# Patient Record
Sex: FEMALE | Race: OTHER | HISPANIC OR LATINO | URBAN - METROPOLITAN AREA
[De-identification: names, ages, dates, MRNs, and addresses within clinical notes are randomized per-mention and may not be internally consistent; named-entity substitution may affect disease eponyms.]

---

## 2017-01-04 ENCOUNTER — EMERGENCY (EMERGENCY)
Facility: HOSPITAL | Age: 22
LOS: 1 days | Discharge: HOME | End: 2017-01-04
Attending: EMERGENCY MEDICINE
Payer: MEDICAID

## 2017-01-04 VITALS
HEIGHT: 60 IN | HEART RATE: 108 BPM | WEIGHT: 110.01 LBS | OXYGEN SATURATION: 99 % | TEMPERATURE: 98 F | SYSTOLIC BLOOD PRESSURE: 117 MMHG | RESPIRATION RATE: 20 BRPM | DIASTOLIC BLOOD PRESSURE: 61 MMHG

## 2017-01-04 DIAGNOSIS — Z3A.10 10 WEEKS GESTATION OF PREGNANCY: ICD-10-CM

## 2017-01-04 DIAGNOSIS — O21.0 MILD HYPEREMESIS GRAVIDARUM: ICD-10-CM

## 2017-01-04 PROCEDURE — 99284 EMERGENCY DEPT VISIT MOD MDM: CPT | Mod: 25

## 2017-01-05 LAB
APPEARANCE UR: CLEAR — SIGNIFICANT CHANGE UP
BILIRUB UR-MCNC: NEGATIVE — SIGNIFICANT CHANGE UP
COLOR SPEC: YELLOW — SIGNIFICANT CHANGE UP
DIFF PNL FLD: ABNORMAL
GLUCOSE UR QL: NEGATIVE — SIGNIFICANT CHANGE UP
KETONES UR-MCNC: ABNORMAL
LEUKOCYTE ESTERASE UR-ACNC: ABNORMAL
NITRITE UR-MCNC: NEGATIVE — SIGNIFICANT CHANGE UP
PH UR: 6 — SIGNIFICANT CHANGE UP (ref 4.8–8)
PROT UR-MCNC: 30 MG/DL
SP GR SPEC: 1.02 — SIGNIFICANT CHANGE UP (ref 1.01–1.02)
UROBILINOGEN FLD QL: NEGATIVE — SIGNIFICANT CHANGE UP

## 2017-01-05 PROCEDURE — 81001 URINALYSIS AUTO W/SCOPE: CPT

## 2017-01-05 PROCEDURE — 96374 THER/PROPH/DIAG INJ IV PUSH: CPT

## 2017-01-05 PROCEDURE — 99284 EMERGENCY DEPT VISIT MOD MDM: CPT | Mod: 25

## 2017-01-05 RX ORDER — ONDANSETRON 8 MG/1
1 TABLET, FILM COATED ORAL
Qty: 28 | Refills: 0 | OUTPATIENT
Start: 2017-01-05 | End: 2017-01-12

## 2017-01-05 RX ORDER — SODIUM CHLORIDE 9 MG/ML
1000 INJECTION INTRAMUSCULAR; INTRAVENOUS; SUBCUTANEOUS ONCE
Qty: 0 | Refills: 0 | Status: COMPLETED | OUTPATIENT
Start: 2017-01-05 | End: 2017-01-05

## 2017-01-05 RX ORDER — NITROFURANTOIN MACROCRYSTAL 50 MG
100 CAPSULE ORAL
Qty: 0 | Refills: 0 | Status: DISCONTINUED | OUTPATIENT
Start: 2017-01-05 | End: 2017-01-08

## 2017-01-05 RX ORDER — ONDANSETRON 8 MG/1
4 TABLET, FILM COATED ORAL ONCE
Qty: 0 | Refills: 0 | Status: COMPLETED | OUTPATIENT
Start: 2017-01-05 | End: 2017-01-05

## 2017-01-05 RX ORDER — SODIUM CHLORIDE 9 MG/ML
1000 INJECTION, SOLUTION INTRAVENOUS
Qty: 0 | Refills: 0 | Status: DISCONTINUED | OUTPATIENT
Start: 2017-01-05 | End: 2017-01-08

## 2017-01-05 RX ORDER — NITROFURANTOIN MACROCRYSTAL 50 MG
1 CAPSULE ORAL
Qty: 20 | Refills: 0 | OUTPATIENT
Start: 2017-01-05 | End: 2017-01-15

## 2017-01-05 RX ADMIN — SODIUM CHLORIDE 1000 MILLILITER(S): 9 INJECTION, SOLUTION INTRAVENOUS at 01:29

## 2017-01-05 RX ADMIN — ONDANSETRON 4 MILLIGRAM(S): 8 TABLET, FILM COATED ORAL at 01:28

## 2017-01-05 RX ADMIN — SODIUM CHLORIDE 4000 MILLILITER(S): 9 INJECTION INTRAMUSCULAR; INTRAVENOUS; SUBCUTANEOUS at 01:28

## 2017-01-05 NOTE — ED ADULT NURSE NOTE - OBJECTIVE STATEMENT
Pt is 10 weeks pregnant, co nausea, vomiting, denies diarrhea for the past month. pain on left lower abd.

## 2017-01-05 NOTE — ED PROVIDER NOTE - MEDICAL DECISION MAKING DETAILS
20 y/o F, , 10 weeks pregnant presenting with nausea and dizziness x 2 days. Will give antiemetics, IV fluids, check UA, and PO challenge pt before discharge. 22 y/o F, , 10 weeks pregnant presenting with nausea and dizziness x 2 days. Will give antiemetics, IV fluids, check UA, and PO challenge pt before discharge.  patient tolerating po after 1L NS and 1L NS + 5% dextrose. Treated for bacteriuria. follow up with ob physician.

## 2017-01-05 NOTE — ED PROVIDER NOTE - OBJECTIVE STATEMENT
22 y/o F pt with no significant PMHx presents to ED c/o nausea, vomiting, lightheadedness and dizziness x 2 days. Pt relates that she is , 10 weeks pregnant at the moment. Pt endorses abd pain 2/2 vomiting and vaginal discharge. Pt denies vaginal spotting or any other complaints. NKDA. 20 y/o F pt with no significant PMHx presents to ED c/o nausea, vomiting, lightheadedness and dizziness x 2 days. Pt relates that she is , 10 weeks pregnant at the moment. Has had sono by ob, + IUP. Pt endorses abd pain 2/2 vomiting. Pt denies vaginal spotting, dc or any other complaints. NKDA.

## 2017-01-05 NOTE — ED PROVIDER NOTE - CONDUCTED A DETAILED DISCUSSION WITH PATIENT AND/OR GUARDIAN REGARDING, MDM
need for outpatient follow-up/return to ED if symptoms worsen, persist or questions arise/lab results

## 2017-01-05 NOTE — ED PROVIDER NOTE - NS ED MD SCRIBE ATTENDING SCRIBE SECTIONS
REVIEW OF SYSTEMS/HISTORY OF PRESENT ILLNESS/PAST MEDICAL/SURGICAL/SOCIAL HISTORY/VITAL SIGNS( Pullset)/PHYSICAL EXAM/DISPOSITION/HIV

## 2017-07-09 ENCOUNTER — OUTPATIENT (OUTPATIENT)
Dept: OUTPATIENT SERVICES | Facility: HOSPITAL | Age: 22
LOS: 1 days | End: 2017-07-09
Payer: MEDICAID

## 2017-07-09 DIAGNOSIS — O26.899 OTHER SPECIFIED PREGNANCY RELATED CONDITIONS, UNSPECIFIED TRIMESTER: ICD-10-CM

## 2017-07-09 DIAGNOSIS — Z3A.00 WEEKS OF GESTATION OF PREGNANCY NOT SPECIFIED: ICD-10-CM

## 2017-07-09 LAB
APTT BLD: 29.2 SEC — SIGNIFICANT CHANGE UP (ref 27.5–37.4)
FIBRINOGEN PPP-MCNC: 623 MG/DL — HIGH (ref 310–510)
HCT VFR BLD CALC: 34.1 % — LOW (ref 34.5–45)
HGB BLD-MCNC: 11.5 G/DL — SIGNIFICANT CHANGE UP (ref 11.5–15.5)
INR BLD: 1.04 RATIO — SIGNIFICANT CHANGE UP (ref 0.88–1.16)
KLEIHAUER-BETKE CALCULATION: 0 % — SIGNIFICANT CHANGE UP (ref 0–0.3)
MCHC RBC-ENTMCNC: 29.4 PG — SIGNIFICANT CHANGE UP (ref 27–34)
MCHC RBC-ENTMCNC: 33.6 GM/DL — SIGNIFICANT CHANGE UP (ref 32–36)
MCV RBC AUTO: 87.6 FL — SIGNIFICANT CHANGE UP (ref 80–100)
PLATELET # BLD AUTO: 210 K/UL — SIGNIFICANT CHANGE UP (ref 150–400)
PROTHROM AB SERPL-ACNC: 11.4 SEC — SIGNIFICANT CHANGE UP (ref 9.8–12.7)
RBC # BLD: 3.9 M/UL — SIGNIFICANT CHANGE UP (ref 3.8–5.2)
RBC # FLD: 11.9 % — SIGNIFICANT CHANGE UP (ref 10.3–14.5)
WBC # BLD: 8 K/UL — SIGNIFICANT CHANGE UP (ref 3.8–10.5)
WBC # FLD AUTO: 8 K/UL — SIGNIFICANT CHANGE UP (ref 3.8–10.5)

## 2017-07-09 PROCEDURE — 76815 OB US LIMITED FETUS(S): CPT

## 2017-07-09 PROCEDURE — 76818 FETAL BIOPHYS PROFILE W/NST: CPT

## 2017-07-09 PROCEDURE — 59025 FETAL NON-STRESS TEST: CPT

## 2017-07-09 PROCEDURE — 85384 FIBRINOGEN ACTIVITY: CPT

## 2017-07-09 PROCEDURE — 85610 PROTHROMBIN TIME: CPT

## 2017-07-09 PROCEDURE — 86901 BLOOD TYPING SEROLOGIC RH(D): CPT

## 2017-07-09 PROCEDURE — 85460 HEMOGLOBIN FETAL: CPT

## 2017-07-09 PROCEDURE — 85730 THROMBOPLASTIN TIME PARTIAL: CPT

## 2017-07-09 PROCEDURE — 85027 COMPLETE CBC AUTOMATED: CPT

## 2017-07-09 PROCEDURE — 76818 FETAL BIOPHYS PROFILE W/NST: CPT | Mod: 26

## 2017-07-09 PROCEDURE — 86850 RBC ANTIBODY SCREEN: CPT

## 2017-07-09 PROCEDURE — 76815 OB US LIMITED FETUS(S): CPT | Mod: 26

## 2017-07-09 PROCEDURE — 86900 BLOOD TYPING SEROLOGIC ABO: CPT

## 2017-07-09 PROCEDURE — G0463: CPT

## 2017-07-09 RX ORDER — SODIUM CHLORIDE 9 MG/ML
1000 INJECTION, SOLUTION INTRAVENOUS
Qty: 0 | Refills: 0 | Status: DISCONTINUED | OUTPATIENT
Start: 2017-07-09 | End: 2017-07-24

## 2017-08-07 ENCOUNTER — INPATIENT (INPATIENT)
Facility: HOSPITAL | Age: 22
LOS: 2 days | Discharge: HOME | End: 2017-08-10
Attending: SPECIALIST | Admitting: SPECIALIST

## 2017-08-07 ENCOUNTER — OUTPATIENT (OUTPATIENT)
Dept: OUTPATIENT SERVICES | Facility: HOSPITAL | Age: 22
LOS: 1 days | End: 2017-08-07

## 2017-08-07 VITALS — WEIGHT: 169.76 LBS | HEIGHT: 60 IN

## 2017-08-07 DIAGNOSIS — O26.899 OTHER SPECIFIED PREGNANCY RELATED CONDITIONS, UNSPECIFIED TRIMESTER: ICD-10-CM

## 2017-08-07 DIAGNOSIS — Z3A.00 WEEKS OF GESTATION OF PREGNANCY NOT SPECIFIED: ICD-10-CM

## 2017-08-07 LAB
BASOPHILS # BLD AUTO: 0.02 K/UL — SIGNIFICANT CHANGE UP (ref 0–0.2)
BASOPHILS NFR BLD AUTO: 0.2 % — SIGNIFICANT CHANGE UP (ref 0–2)
EOSINOPHIL # BLD AUTO: 0.01 K/UL — SIGNIFICANT CHANGE UP (ref 0–0.5)
EOSINOPHIL NFR BLD AUTO: 0.1 % — SIGNIFICANT CHANGE UP (ref 0–6)
HCT VFR BLD CALC: 33.1 % — LOW (ref 34.5–45)
HGB BLD-MCNC: 11.4 G/DL — LOW (ref 11.5–15.5)
IMM GRANULOCYTES # BLD AUTO: 0.04 # — SIGNIFICANT CHANGE UP
IMM GRANULOCYTES NFR BLD AUTO: 0.4 % — SIGNIFICANT CHANGE UP (ref 0–1.5)
LYMPHOCYTES # BLD AUTO: 1.33 K/UL — SIGNIFICANT CHANGE UP (ref 1–3.3)
LYMPHOCYTES # BLD AUTO: 12.1 % — LOW (ref 13–44)
MCHC RBC-ENTMCNC: 29.2 PG — SIGNIFICANT CHANGE UP (ref 27–34)
MCHC RBC-ENTMCNC: 34.4 % — SIGNIFICANT CHANGE UP (ref 32–36)
MCV RBC AUTO: 84.7 FL — SIGNIFICANT CHANGE UP (ref 80–100)
MONOCYTES # BLD AUTO: 0.57 K/UL — SIGNIFICANT CHANGE UP (ref 0–0.9)
MONOCYTES NFR BLD AUTO: 5.2 % — SIGNIFICANT CHANGE UP (ref 2–14)
NEUTROPHILS # BLD AUTO: 9.02 K/UL — HIGH (ref 1.8–7.4)
NEUTROPHILS NFR BLD AUTO: 82 % — HIGH (ref 43–77)
NRBC # FLD: 0 — SIGNIFICANT CHANGE UP
PLATELET # BLD AUTO: 243 K/UL — SIGNIFICANT CHANGE UP (ref 150–400)
PMV BLD: 11.2 FL — SIGNIFICANT CHANGE UP (ref 7–13)
RBC # BLD: 3.91 M/UL — SIGNIFICANT CHANGE UP (ref 3.8–5.2)
RBC # FLD: 13.5 % — SIGNIFICANT CHANGE UP (ref 10.3–14.5)
WBC # BLD: 10.99 K/UL — HIGH (ref 3.8–10.5)
WBC # FLD AUTO: 10.99 K/UL — HIGH (ref 3.8–10.5)

## 2017-08-07 RX ORDER — SODIUM CHLORIDE 9 MG/ML
1000 INJECTION, SOLUTION INTRAVENOUS
Qty: 0 | Refills: 0 | Status: DISCONTINUED | OUTPATIENT
Start: 2017-08-07 | End: 2017-08-08

## 2017-08-07 RX ORDER — AMPICILLIN TRIHYDRATE 250 MG
CAPSULE ORAL
Qty: 0 | Refills: 0 | Status: DISCONTINUED | OUTPATIENT
Start: 2017-08-07 | End: 2017-08-09

## 2017-08-07 RX ORDER — SODIUM CHLORIDE 9 MG/ML
1000 INJECTION, SOLUTION INTRAVENOUS ONCE
Qty: 0 | Refills: 0 | Status: COMPLETED | OUTPATIENT
Start: 2017-08-07 | End: 2017-08-08

## 2017-08-07 RX ORDER — AMPICILLIN TRIHYDRATE 250 MG
2 CAPSULE ORAL ONCE
Qty: 0 | Refills: 0 | Status: COMPLETED | OUTPATIENT
Start: 2017-08-07 | End: 2017-08-07

## 2017-08-07 RX ORDER — OXYTOCIN 10 UNIT/ML
333.33 VIAL (ML) INJECTION
Qty: 20 | Refills: 0 | Status: DISCONTINUED | OUTPATIENT
Start: 2017-08-07 | End: 2017-08-08

## 2017-08-07 RX ORDER — AMPICILLIN TRIHYDRATE 250 MG
1 CAPSULE ORAL EVERY 4 HOURS
Qty: 0 | Refills: 0 | Status: DISCONTINUED | OUTPATIENT
Start: 2017-08-08 | End: 2017-08-09

## 2017-08-07 RX ADMIN — Medication 108 GRAM(S): at 23:09

## 2017-08-07 RX ADMIN — SODIUM CHLORIDE 125 MILLILITER(S): 9 INJECTION, SOLUTION INTRAVENOUS at 23:09

## 2017-08-08 LAB
BLD GP AB SCN SERPL QL: NEGATIVE — SIGNIFICANT CHANGE UP
RH IG SCN BLD-IMP: POSITIVE — SIGNIFICANT CHANGE UP
RH IG SCN BLD-IMP: POSITIVE — SIGNIFICANT CHANGE UP

## 2017-08-08 RX ORDER — OXYTOCIN 10 UNIT/ML
41.67 VIAL (ML) INJECTION
Qty: 20 | Refills: 0 | Status: DISCONTINUED | OUTPATIENT
Start: 2017-08-08 | End: 2017-08-09

## 2017-08-08 RX ORDER — AER TRAVELER 0.5 G/1
1 SOLUTION RECTAL; TOPICAL EVERY 4 HOURS
Qty: 0 | Refills: 0 | Status: DISCONTINUED | OUTPATIENT
Start: 2017-08-08 | End: 2017-08-10

## 2017-08-08 RX ORDER — SODIUM CHLORIDE 9 MG/ML
3 INJECTION INTRAMUSCULAR; INTRAVENOUS; SUBCUTANEOUS EVERY 8 HOURS
Qty: 0 | Refills: 0 | Status: DISCONTINUED | OUTPATIENT
Start: 2017-08-08 | End: 2017-08-09

## 2017-08-08 RX ORDER — PRAMOXINE HYDROCHLORIDE 150 MG/15G
1 AEROSOL, FOAM RECTAL EVERY 4 HOURS
Qty: 0 | Refills: 0 | Status: DISCONTINUED | OUTPATIENT
Start: 2017-08-08 | End: 2017-08-09

## 2017-08-08 RX ORDER — LEVOTHYROXINE SODIUM 125 MCG
37.5 TABLET ORAL DAILY
Qty: 0 | Refills: 0 | Status: DISCONTINUED | OUTPATIENT
Start: 2017-08-08 | End: 2017-08-10

## 2017-08-08 RX ORDER — HYDROCORTISONE 1 %
1 OINTMENT (GRAM) TOPICAL EVERY 4 HOURS
Qty: 0 | Refills: 0 | Status: DISCONTINUED | OUTPATIENT
Start: 2017-08-08 | End: 2017-08-09

## 2017-08-08 RX ORDER — CITRIC ACID/SODIUM CITRATE 300-500 MG
30 SOLUTION, ORAL ORAL ONCE
Qty: 0 | Refills: 0 | Status: COMPLETED | OUTPATIENT
Start: 2017-08-08 | End: 2017-08-08

## 2017-08-08 RX ORDER — IBUPROFEN 200 MG
600 TABLET ORAL EVERY 6 HOURS
Qty: 0 | Refills: 0 | Status: COMPLETED | OUTPATIENT
Start: 2017-08-08 | End: 2018-07-07

## 2017-08-08 RX ORDER — LEVOTHYROXINE SODIUM 125 MCG
32.5 TABLET ORAL DAILY
Qty: 0 | Refills: 0 | Status: DISCONTINUED | OUTPATIENT
Start: 2017-08-08 | End: 2017-08-08

## 2017-08-08 RX ORDER — TETANUS TOXOID, REDUCED DIPHTHERIA TOXOID AND ACELLULAR PERTUSSIS VACCINE, ADSORBED 5; 2.5; 8; 8; 2.5 [IU]/.5ML; [IU]/.5ML; UG/.5ML; UG/.5ML; UG/.5ML
0.5 SUSPENSION INTRAMUSCULAR ONCE
Qty: 0 | Refills: 0 | Status: DISCONTINUED | OUTPATIENT
Start: 2017-08-08 | End: 2017-08-10

## 2017-08-08 RX ORDER — GLYCERIN ADULT
1 SUPPOSITORY, RECTAL RECTAL AT BEDTIME
Qty: 0 | Refills: 0 | Status: DISCONTINUED | OUTPATIENT
Start: 2017-08-08 | End: 2017-08-10

## 2017-08-08 RX ORDER — OXYTOCIN 10 UNIT/ML
2 VIAL (ML) INJECTION
Qty: 30 | Refills: 0 | Status: DISCONTINUED | OUTPATIENT
Start: 2017-08-08 | End: 2017-08-09

## 2017-08-08 RX ORDER — KETOROLAC TROMETHAMINE 30 MG/ML
30 SYRINGE (ML) INJECTION ONCE
Qty: 0 | Refills: 0 | Status: DISCONTINUED | OUTPATIENT
Start: 2017-08-08 | End: 2017-08-08

## 2017-08-08 RX ORDER — MAGNESIUM HYDROXIDE 400 MG/1
30 TABLET, CHEWABLE ORAL
Qty: 0 | Refills: 0 | Status: DISCONTINUED | OUTPATIENT
Start: 2017-08-08 | End: 2017-08-10

## 2017-08-08 RX ORDER — LEVOTHYROXINE SODIUM 125 MCG
37.5 TABLET ORAL
Qty: 0 | Refills: 0 | COMMUNITY

## 2017-08-08 RX ORDER — OXYCODONE HYDROCHLORIDE 5 MG/1
5 TABLET ORAL EVERY 4 HOURS
Qty: 0 | Refills: 0 | Status: DISCONTINUED | OUTPATIENT
Start: 2017-08-08 | End: 2017-08-10

## 2017-08-08 RX ORDER — DIBUCAINE 1 %
1 OINTMENT (GRAM) RECTAL EVERY 4 HOURS
Qty: 0 | Refills: 0 | Status: DISCONTINUED | OUTPATIENT
Start: 2017-08-08 | End: 2017-08-10

## 2017-08-08 RX ORDER — SIMETHICONE 80 MG/1
80 TABLET, CHEWABLE ORAL EVERY 6 HOURS
Qty: 0 | Refills: 0 | Status: DISCONTINUED | OUTPATIENT
Start: 2017-08-08 | End: 2017-08-10

## 2017-08-08 RX ORDER — DIPHENHYDRAMINE HCL 50 MG
25 CAPSULE ORAL EVERY 6 HOURS
Qty: 0 | Refills: 0 | Status: DISCONTINUED | OUTPATIENT
Start: 2017-08-08 | End: 2017-08-10

## 2017-08-08 RX ORDER — DIBUCAINE 1 %
1 OINTMENT (GRAM) RECTAL EVERY 4 HOURS
Qty: 0 | Refills: 0 | Status: DISCONTINUED | OUTPATIENT
Start: 2017-08-08 | End: 2017-08-09

## 2017-08-08 RX ORDER — SODIUM CHLORIDE 9 MG/ML
3 INJECTION INTRAMUSCULAR; INTRAVENOUS; SUBCUTANEOUS EVERY 8 HOURS
Qty: 0 | Refills: 0 | Status: DISCONTINUED | OUTPATIENT
Start: 2017-08-08 | End: 2017-08-10

## 2017-08-08 RX ORDER — AER TRAVELER 0.5 G/1
1 SOLUTION RECTAL; TOPICAL EVERY 4 HOURS
Qty: 0 | Refills: 0 | Status: DISCONTINUED | OUTPATIENT
Start: 2017-08-08 | End: 2017-08-09

## 2017-08-08 RX ORDER — DOCUSATE SODIUM 100 MG
100 CAPSULE ORAL
Qty: 0 | Refills: 0 | Status: DISCONTINUED | OUTPATIENT
Start: 2017-08-08 | End: 2017-08-10

## 2017-08-08 RX ORDER — BUTORPHANOL TARTRATE 2 MG/ML
2 INJECTION, SOLUTION INTRAMUSCULAR; INTRAVENOUS ONCE
Qty: 0 | Refills: 0 | Status: DISCONTINUED | OUTPATIENT
Start: 2017-08-08 | End: 2017-08-08

## 2017-08-08 RX ORDER — OXYCODONE HYDROCHLORIDE 5 MG/1
5 TABLET ORAL
Qty: 0 | Refills: 0 | Status: DISCONTINUED | OUTPATIENT
Start: 2017-08-08 | End: 2017-08-10

## 2017-08-08 RX ORDER — METOCLOPRAMIDE HCL 10 MG
10 TABLET ORAL ONCE
Qty: 0 | Refills: 0 | Status: COMPLETED | OUTPATIENT
Start: 2017-08-08 | End: 2017-08-08

## 2017-08-08 RX ORDER — FAMOTIDINE 10 MG/ML
20 INJECTION INTRAVENOUS ONCE
Qty: 0 | Refills: 0 | Status: COMPLETED | OUTPATIENT
Start: 2017-08-08 | End: 2017-08-08

## 2017-08-08 RX ORDER — ACETAMINOPHEN 500 MG
975 TABLET ORAL EVERY 6 HOURS
Qty: 0 | Refills: 0 | Status: COMPLETED | OUTPATIENT
Start: 2017-08-08 | End: 2018-07-07

## 2017-08-08 RX ORDER — LANOLIN
1 OINTMENT (GRAM) TOPICAL EVERY 6 HOURS
Qty: 0 | Refills: 0 | Status: DISCONTINUED | OUTPATIENT
Start: 2017-08-08 | End: 2017-08-10

## 2017-08-08 RX ADMIN — BUTORPHANOL TARTRATE 2 MILLIGRAM(S): 2 INJECTION, SOLUTION INTRAMUSCULAR; INTRAVENOUS at 01:27

## 2017-08-08 RX ADMIN — SODIUM CHLORIDE 2000 MILLILITER(S): 9 INJECTION, SOLUTION INTRAVENOUS at 04:00

## 2017-08-08 RX ADMIN — FAMOTIDINE 20 MILLIGRAM(S): 10 INJECTION INTRAVENOUS at 14:21

## 2017-08-08 RX ADMIN — Medication 10 MILLIGRAM(S): at 14:21

## 2017-08-08 RX ADMIN — Medication 204 MILLIGRAM(S): at 01:09

## 2017-08-08 RX ADMIN — Medication 2 MILLIUNIT(S)/MIN: at 09:56

## 2017-08-08 RX ADMIN — SODIUM CHLORIDE 125 MILLILITER(S): 9 INJECTION, SOLUTION INTRAVENOUS at 07:29

## 2017-08-08 RX ADMIN — Medication 30 MILLIGRAM(S): at 17:31

## 2017-08-08 RX ADMIN — Medication 108 GRAM(S): at 11:20

## 2017-08-08 RX ADMIN — Medication 125 MILLIUNIT(S)/MIN: at 17:30

## 2017-08-08 RX ADMIN — Medication 30 MILLIGRAM(S): at 16:42

## 2017-08-08 RX ADMIN — Medication 108 GRAM(S): at 07:29

## 2017-08-08 RX ADMIN — BUTORPHANOL TARTRATE 2 MILLIGRAM(S): 2 INJECTION, SOLUTION INTRAMUSCULAR; INTRAVENOUS at 01:12

## 2017-08-08 RX ADMIN — Medication 108 GRAM(S): at 03:33

## 2017-08-08 RX ADMIN — Medication 37.5 MICROGRAM(S): at 08:48

## 2017-08-08 NOTE — DISCHARGE NOTE OB - CARE PROVIDER_API CALL
Chandana Parish (MD), Obstetrics and Gynecology  [address] [place] [address]  Phone: [phone]  Fax: [phone]

## 2017-08-08 NOTE — DISCHARGE NOTE OB - MEDICATION SUMMARY - MEDICATIONS TO TAKE
I will START or STAY ON the medications listed below when I get home from the hospital:  None I will START or STAY ON the medications listed below when I get home from the hospital:    acetaminophen 325 mg oral tablet  -- 3 tab(s) by mouth every 6 hours, As Needed  -- Indication: For pain    ibuprofen 600 mg oral tablet  -- 1 tab(s) by mouth every 6 hours, As Needed  -- Indication: For pain    Prenatal Multivitamins with Folic Acid 1 mg oral tablet  -- 1 tab(s) by mouth once a day  -- Indication: For breastfeeding

## 2017-08-08 NOTE — DISCHARGE NOTE OB - MATERIALS PROVIDED
Shaken Baby Prevention Handout/Tdap Vaccination (VIS Pub Date: 2012)/Birth Certificate Instructions/Guide to Postpartum Care/[facility] Screening Program/Vaccinations/[facility] Hearing Screen Program/Breastfeeding Log/Bottle Feeding Log

## 2017-08-08 NOTE — DISCHARGE NOTE OB - PATIENT PORTAL LINK FT
“You can access the [employer] Patient Portal, offered by [facility], by registering with the following website: [email or web address]”

## 2017-08-09 LAB — T PALLIDUM AB TITR SER: NEGATIVE — SIGNIFICANT CHANGE UP

## 2017-08-09 RX ORDER — HYDROCORTISONE 1 %
1 OINTMENT (GRAM) TOPICAL EVERY 4 HOURS
Qty: 0 | Refills: 0 | Status: DISCONTINUED | OUTPATIENT
Start: 2017-08-09 | End: 2017-08-10

## 2017-08-09 RX ORDER — PRAMOXINE HYDROCHLORIDE 150 MG/15G
1 AEROSOL, FOAM RECTAL EVERY 4 HOURS
Qty: 0 | Refills: 0 | Status: DISCONTINUED | OUTPATIENT
Start: 2017-08-09 | End: 2017-08-10

## 2017-08-09 RX ORDER — IBUPROFEN 200 MG
600 TABLET ORAL EVERY 6 HOURS
Qty: 0 | Refills: 0 | Status: DISCONTINUED | OUTPATIENT
Start: 2017-08-09 | End: 2017-08-10

## 2017-08-09 RX ORDER — ACETAMINOPHEN 500 MG
975 TABLET ORAL EVERY 6 HOURS
Qty: 0 | Refills: 0 | Status: DISCONTINUED | OUTPATIENT
Start: 2017-08-09 | End: 2017-08-10

## 2017-08-09 RX ADMIN — Medication 37.5 MICROGRAM(S): at 06:30

## 2017-08-09 RX ADMIN — Medication 975 MILLIGRAM(S): at 07:00

## 2017-08-09 RX ADMIN — SODIUM CHLORIDE 3 MILLILITER(S): 9 INJECTION INTRAMUSCULAR; INTRAVENOUS; SUBCUTANEOUS at 06:29

## 2017-08-09 RX ADMIN — Medication 600 MILLIGRAM(S): at 06:30

## 2017-08-09 RX ADMIN — Medication 1 TABLET(S): at 11:06

## 2017-08-09 RX ADMIN — Medication 975 MILLIGRAM(S): at 06:30

## 2017-08-09 RX ADMIN — Medication 600 MILLIGRAM(S): at 07:00

## 2017-08-09 RX ADMIN — SODIUM CHLORIDE 3 MILLILITER(S): 9 INJECTION INTRAMUSCULAR; INTRAVENOUS; SUBCUTANEOUS at 14:04

## 2017-08-09 RX ADMIN — SODIUM CHLORIDE 3 MILLILITER(S): 9 INJECTION INTRAMUSCULAR; INTRAVENOUS; SUBCUTANEOUS at 22:00

## 2017-08-09 NOTE — PROGRESS NOTE ADULT - SUBJECTIVE AND OBJECTIVE BOX
S: Patient doing well.     Pain controlled.  +OOB.  +void.    Tolerating PO.  Lochia WNL.    O: Vital Signs Last 24 Hrs  T(C): 36.9 (09 Aug 2017 06:24), Max: 37.2 (08 Aug 2017 15:30)  T(F): 98.4 (09 Aug 2017 06:24), Max: 99 (08 Aug 2017 15:30)  HR: 101 (09 Aug 2017 06:24) (93 - 129)  BP: 125/65 (09 Aug 2017 06:24) (123/59 - 149/72)  BP(mean): --  RR: 18 (09 Aug 2017 06:24) (18 - 18)  SpO2: 100% (09 Aug 2017 06:24) (100% - 100%)      Pt without complaints  vital signs stable  Abdomen soft  fundus firm, non tender  extremities non tender  lochia wnl    Patient doing well  Routine post partum care    Labs:                        11.4   10.99 )-----------( 243      ( 07 Aug 2017 22:50 )             33.1       ABO Interpretation: O ( @ 22:52)    Rh Interpretation: Positive ( @ 22:52)    Antibody Screen Negative      A: 22y s/p  doing well.   -Continue routine postpartum care.  -Discharge planned for tomorrow

## 2017-08-09 NOTE — PROGRESS NOTE ADULT - SUBJECTIVE AND OBJECTIVE BOX
Anesthesia Post-op Note    POD#1 S/P Vaginal Delivery with epidural.    Patient is doing well.  OOBAA. Tolerating clears.  Pain is tolerable.  No residual anesthetic issues or complications noted.    Ellyn Ta CRNA

## 2017-08-10 VITALS
OXYGEN SATURATION: 100 % | RESPIRATION RATE: 18 BRPM | HEART RATE: 80 BPM | DIASTOLIC BLOOD PRESSURE: 74 MMHG | TEMPERATURE: 98 F | SYSTOLIC BLOOD PRESSURE: 119 MMHG

## 2017-08-10 LAB — HCV GENTYP BLD NAA+PROBE: SIGNIFICANT CHANGE UP

## 2017-08-10 RX ORDER — ACETAMINOPHEN 500 MG
3 TABLET ORAL
Qty: 0 | Refills: 0 | DISCHARGE
Start: 2017-08-10

## 2017-08-10 RX ORDER — IBUPROFEN 200 MG
1 TABLET ORAL
Qty: 0 | Refills: 0 | DISCHARGE
Start: 2017-08-10

## 2017-08-10 RX ADMIN — Medication 600 MILLIGRAM(S): at 06:30

## 2017-08-10 RX ADMIN — Medication 600 MILLIGRAM(S): at 06:01

## 2017-08-10 RX ADMIN — Medication 37.5 MICROGRAM(S): at 06:01

## 2017-08-10 RX ADMIN — Medication 975 MILLIGRAM(S): at 06:30

## 2017-08-10 RX ADMIN — Medication 975 MILLIGRAM(S): at 06:01

## 2017-08-10 NOTE — LACTATION INITIAL EVALUATION - LACTATION INTERVENTIONS
Patients family complained no one came in from Lactation to help patient with breastfeeding.  Spoke to family and encouraged them to ask for help as needed, and explained nurses are also able to assist.  Spent time with patient and baby latched effectively.  Discussed safe skin to skin and rooming in.  Taught patient how to hand express and left printed material.  Reviewed breastfeeding information in the patient booklet received.  Discussed importance of wet and dirty diapers and deep latch and positioning.  It appears that patients family members would like her to exclusively breastfeed.  Patient seems interested in formula feeding as well.  Discussed safe formula prep.  Feeding on demand, and pumping if doing more formula feeding than breastfeeding to produce milk.  All questions and concerns addressed.  Outside support offered.  Told patient to call for any assistance needed and left her my card./initiate hand expression routine/initiate skin to skin

## 2018-06-18 NOTE — DISCHARGE NOTE OB - VISION (WITH CORRECTIVE LENSES IF THE PATIENT USUALLY WEARS THEM):
Constitutional: Well developed, well nourished. NAD, Comfortable. Interactive. Smiling. Playful. Nontoxic.  Head: Normocephalic, atraumatic.  Eyes: PERRL. EOMI.  ENT: No nasal discharge. Mucous membranes moist.  Neck: Supple. Painless ROM.  Cardiovascular: Normal S1, S2. Regular rate and rhythm. No murmurs, rubs, or gallops.  Pulmonary: Normal respiratory rate and effort. Lungs clear to auscultation bilaterally.  Abdominal: Soft. Nondistended. Nontender. No rebound, guarding, rigidity.  : Normal external labia, no trauma. Normal internal labia. No bruises, lacerations, abrasions around vagina or rectum.  Skin: No rashes  Neuro: AAOx3. No focal neurological deficits.  Psych: Normal mood. Normal affect.
Normal vision: sees adequately in most situations; can see medication labels, newsprint

## 2019-06-26 ENCOUNTER — INPATIENT (INPATIENT)
Facility: HOSPITAL | Age: 24
LOS: 0 days | Discharge: HOME | End: 2019-06-27
Attending: INTERNAL MEDICINE | Admitting: INTERNAL MEDICINE
Payer: MEDICAID

## 2019-06-26 VITALS
RESPIRATION RATE: 16 BRPM | TEMPERATURE: 99 F | HEART RATE: 128 BPM | WEIGHT: 110.01 LBS | OXYGEN SATURATION: 100 % | SYSTOLIC BLOOD PRESSURE: 109 MMHG | DIASTOLIC BLOOD PRESSURE: 69 MMHG

## 2019-06-26 LAB
ABO RH CONFIRMATION: SIGNIFICANT CHANGE UP
ALBUMIN SERPL ELPH-MCNC: 3.2 G/DL — LOW (ref 3.3–5)
ALP SERPL-CCNC: 57 U/L — SIGNIFICANT CHANGE UP (ref 40–120)
ALT FLD-CCNC: 20 U/L — SIGNIFICANT CHANGE UP (ref 12–78)
ANION GAP SERPL CALC-SCNC: 8 MMOL/L — SIGNIFICANT CHANGE UP (ref 5–17)
AST SERPL-CCNC: 12 U/L — LOW (ref 15–37)
BASOPHILS # BLD AUTO: 0.01 K/UL — SIGNIFICANT CHANGE UP (ref 0–0.2)
BASOPHILS NFR BLD AUTO: 0.1 % — SIGNIFICANT CHANGE UP (ref 0–2)
BILIRUB SERPL-MCNC: 0.2 MG/DL — SIGNIFICANT CHANGE UP (ref 0.2–1.2)
BLD GP AB SCN SERPL QL: SIGNIFICANT CHANGE UP
BUN SERPL-MCNC: 9 MG/DL — SIGNIFICANT CHANGE UP (ref 7–23)
CALCIUM SERPL-MCNC: 8.4 MG/DL — LOW (ref 8.5–10.1)
CHLORIDE SERPL-SCNC: 108 MMOL/L — SIGNIFICANT CHANGE UP (ref 96–108)
CO2 SERPL-SCNC: 25 MMOL/L — SIGNIFICANT CHANGE UP (ref 22–31)
CREAT SERPL-MCNC: 0.61 MG/DL — SIGNIFICANT CHANGE UP (ref 0.5–1.3)
EOSINOPHIL # BLD AUTO: 0.01 K/UL — SIGNIFICANT CHANGE UP (ref 0–0.5)
EOSINOPHIL NFR BLD AUTO: 0.1 % — SIGNIFICANT CHANGE UP (ref 0–6)
GLUCOSE SERPL-MCNC: 86 MG/DL — SIGNIFICANT CHANGE UP (ref 70–99)
HCG SERPL-ACNC: 305 MIU/ML — HIGH
HCT VFR BLD CALC: 17.4 % — CRITICAL LOW (ref 34.5–45)
HGB BLD-MCNC: 5.4 G/DL — CRITICAL LOW (ref 11.5–15.5)
IMM GRANULOCYTES NFR BLD AUTO: 0.3 % — SIGNIFICANT CHANGE UP (ref 0–1.5)
LYMPHOCYTES # BLD AUTO: 2.55 K/UL — SIGNIFICANT CHANGE UP (ref 1–3.3)
LYMPHOCYTES # BLD AUTO: 37.5 % — SIGNIFICANT CHANGE UP (ref 13–44)
MCHC RBC-ENTMCNC: 28.1 PG — SIGNIFICANT CHANGE UP (ref 27–34)
MCHC RBC-ENTMCNC: 31 GM/DL — LOW (ref 32–36)
MCV RBC AUTO: 90.6 FL — SIGNIFICANT CHANGE UP (ref 80–100)
MONOCYTES # BLD AUTO: 0.36 K/UL — SIGNIFICANT CHANGE UP (ref 0–0.9)
MONOCYTES NFR BLD AUTO: 5.3 % — SIGNIFICANT CHANGE UP (ref 2–14)
NEUTROPHILS # BLD AUTO: 3.85 K/UL — SIGNIFICANT CHANGE UP (ref 1.8–7.4)
NEUTROPHILS NFR BLD AUTO: 56.7 % — SIGNIFICANT CHANGE UP (ref 43–77)
NRBC # BLD: 0 /100 WBCS — SIGNIFICANT CHANGE UP (ref 0–0)
PLATELET # BLD AUTO: 488 K/UL — HIGH (ref 150–400)
POTASSIUM SERPL-MCNC: 3.6 MMOL/L — SIGNIFICANT CHANGE UP (ref 3.5–5.3)
POTASSIUM SERPL-SCNC: 3.6 MMOL/L — SIGNIFICANT CHANGE UP (ref 3.5–5.3)
PROT SERPL-MCNC: 6.8 GM/DL — SIGNIFICANT CHANGE UP (ref 6–8.3)
RBC # BLD: 1.92 M/UL — LOW (ref 3.8–5.2)
RBC # FLD: 14.8 % — HIGH (ref 10.3–14.5)
SODIUM SERPL-SCNC: 141 MMOL/L — SIGNIFICANT CHANGE UP (ref 135–145)
WBC # BLD: 6.8 K/UL — SIGNIFICANT CHANGE UP (ref 3.8–10.5)
WBC # FLD AUTO: 6.8 K/UL — SIGNIFICANT CHANGE UP (ref 3.8–10.5)

## 2019-06-26 PROCEDURE — 99223 1ST HOSP IP/OBS HIGH 75: CPT

## 2019-06-26 PROCEDURE — 99284 EMERGENCY DEPT VISIT MOD MDM: CPT

## 2019-06-26 RX ORDER — LEVOTHYROXINE SODIUM 125 MCG
0.5 TABLET ORAL
Qty: 0 | Refills: 0 | DISCHARGE

## 2019-06-26 NOTE — ED PROVIDER NOTE - OBJECTIVE STATEMENT
presents with low h/h after miscarrying 10 week pregnancy two weeks ago    demies milo fever no active bleeding or pain     Mild SOB and lethargy

## 2019-06-26 NOTE — ED ADULT TRIAGE NOTE - CHIEF COMPLAINT QUOTE
low H&H pt states 5.8 hx thyroid Pt denies any hx anemia, recent surgery recent MISCARRIAGE and headache 10/10

## 2019-06-26 NOTE — ED PROVIDER NOTE - PHYSICAL EXAMINATION
Liu:  General: No distress.  Mentation at baseline.   HEENT: WNL; pale conjunctiva  Chest/Lungs: CTAB, No wheeze, No retractions, No increased work of breathing, Normal rate  Heart: S1S2 RRR, No M/R/G, Pules equal Bilaterally in upper and lower extremities distally  Abd: soft, NT/ND, No guarding, No rebound.  No hernias, no palpable masses.  Extrem: FROM in all joints, no significant edema noted, No ulcers.  Cap refil < 2sec.  Skin: No rash noted, warm dry.  Neuro:  Grossly normal.  No difficulty ambulating. No focal deficits.  Psychiatric: No evidence of delusions. No SI/HI.

## 2019-06-26 NOTE — CONSULT NOTE ADULT - ASSESSMENT
24 yr old female w hypothyroid came to ED  as directed by MD for Hb 5.8  w headache x 2 days    # Symptomatic anemia Hb 5.4  # recent miscarriage w 4 pads a day x 2 days; none today  # no hx anemia  MCV 90  1. admit to hosp  2. iron studies  3. transfuse  4. US of pelvis to look for occult bleeding: tubes/uterus    #Thrombocytosis  likely due to acute anemia    #hypothyroid  1. continue synthroid 37.5 mcg daily    #Headache may be due to anemia  1. trial w supplemental oxygen until transfused      IMPROVE VTE Individual Risk Assessment    RISK                                                                Points    [  ] Previous VTE                                                  3    [  ] Thrombophilia                                               2    [  ] Lower limb paralysis                                      2        (unable to hold up >15 seconds)      [  ] Current Cancer                                              2         (within 6 months)    [  ] Immobilization > 24 hrs                                1    [  ] ICU/CCU stay > 24 hours                              1    [  ] Age > 60                                                      1    IMPROVE VTE Score __0_______    IMPROVE Score 0-1: Low Risk, No VTE prophylaxis required for most patients, encourage ambulation.   IMPROVE Score 2-3: At risk, pharmacologic VTE prophylaxis is indicated for most patients (in the absence of a contraindication)  IMPROVE Score > or = 4: High Risk, pharmacologic VTE prophylaxis is indicated for most patients (in the absence of a contraindication)

## 2019-06-26 NOTE — ED ADULT NURSE NOTE - OBJECTIVE STATEMENT
24 y.o female with a hx of hypothyroid complains of headache and weakness, MD states that her hemoglobin was 5.1. patient states shes had a headache for 2 days.

## 2019-06-26 NOTE — CONSULT NOTE ADULT - SUBJECTIVE AND OBJECTIVE BOX
24 yr old female w hypothyroid came to ED  as directed by MD for Hb 5.8  w headache x 2 days      Pt had a miscarriage on  at 10 weeks 2 days,  Presented to [facility] w bleeding and abdominal pain.  Placed on "4 pills to push the baby out"  Has used 4 pads a day  and ,  Scant bleeding today. No abd pain  +headache x 2 days; transiently decreased w tylenol  Had blood drawn yesterday and received call today to go to nearest ED immediately    + Headache, partially decreased w tylenol but recurs..  Cannot grade or tell me what increases pain.  No radiation  No chest pain or SOB      NO PCP  OB/GYN [facility]    PAST MEDICAL HX    AB2  (TOP, recent miscarriage) 1NSVD child will be 2  Hypothyroid    PAST SURGICAL HX  none significant    FAMILY HX   Diabetes: maternal grandmother  Anemia: mother  DVT: mother  no family hx sickle cell or thalassemia    SOCIAL HX  non smoker  no alcohol   occas marijuana    ROS  GEN: +dizziness  HEENT no complaint  RESP no SOB  CARD no pain  GI no N, V, abd pain  GYN no pain or bleeding today  HEME no hx anemia      T(C): 37 (19 @ 16:59), Max: 37 (19 @ 16:59)  HR: 128 (19 @ 16:59) (128 - 128)  BP: 109/69 (19 @ 16:59) (109/69 - 109/69)  RR: 16 (19 @ 16:59) (16 - 16)  SpO2: 100% (19 @ 16:59) (100% - 100%)    PHYSICAL EXAM: evaluation precludes physical exam. Pertinent physical exam findings as per video conference with  teleNA at bedside is as follows:    pleasant female alert, oriented and cooperative  does complain re minor headache that has been persistent x 2 days    HCG, Quant   305                          5.4    6.80  )-----------( 488      ( 2019 19:51 )             17.4     2019 19:51    141    |  108    |  9      ----------------------------<  86     3.6     |  25     |  0.61     Ca    8.4        2019 19:51    TPro  6.8    /  Alb  3.2    /  TBili  0.2    /  DBili  x      /  AST  12     /  ALT  20     /  AlkPhos  57     2019 19:51      CAPILLARY BLOOD GLUCOSE      LIVER FUNCTIONS - ( 2019 19:51 )  Alb: 3.2 g/dL / Pro: 6.8 gm/dL / ALK PHOS: 57 U/L / ALT: 20 U/L / AST: 12 U/L / GGT: x

## 2019-06-27 VITALS
SYSTOLIC BLOOD PRESSURE: 108 MMHG | TEMPERATURE: 99 F | DIASTOLIC BLOOD PRESSURE: 51 MMHG | HEART RATE: 76 BPM | OXYGEN SATURATION: 100 % | RESPIRATION RATE: 16 BRPM

## 2019-06-27 DIAGNOSIS — D50.9 IRON DEFICIENCY ANEMIA, UNSPECIFIED: ICD-10-CM

## 2019-06-27 DIAGNOSIS — O03.9 COMPLETE OR UNSPECIFIED SPONTANEOUS ABORTION WITHOUT COMPLICATION: ICD-10-CM

## 2019-06-27 DIAGNOSIS — D64.9 ANEMIA, UNSPECIFIED: ICD-10-CM

## 2019-06-27 DIAGNOSIS — E03.9 HYPOTHYROIDISM, UNSPECIFIED: ICD-10-CM

## 2019-06-27 LAB
ANION GAP SERPL CALC-SCNC: 8 MMOL/L — SIGNIFICANT CHANGE UP (ref 5–17)
BASOPHILS # BLD AUTO: 0.02 K/UL — SIGNIFICANT CHANGE UP (ref 0–0.2)
BASOPHILS NFR BLD AUTO: 0.4 % — SIGNIFICANT CHANGE UP (ref 0–2)
BUN SERPL-MCNC: 10 MG/DL — SIGNIFICANT CHANGE UP (ref 7–23)
CALCIUM SERPL-MCNC: 8.4 MG/DL — LOW (ref 8.5–10.1)
CHLORIDE SERPL-SCNC: 109 MMOL/L — HIGH (ref 96–108)
CO2 SERPL-SCNC: 23 MMOL/L — SIGNIFICANT CHANGE UP (ref 22–31)
CREAT SERPL-MCNC: 0.62 MG/DL — SIGNIFICANT CHANGE UP (ref 0.5–1.3)
EOSINOPHIL # BLD AUTO: 0.01 K/UL — SIGNIFICANT CHANGE UP (ref 0–0.5)
EOSINOPHIL NFR BLD AUTO: 0.2 % — SIGNIFICANT CHANGE UP (ref 0–6)
FERRITIN SERPL-MCNC: 4 NG/ML — LOW (ref 15–150)
FOLATE SERPL-MCNC: 13.8 NG/ML — SIGNIFICANT CHANGE UP
GLUCOSE SERPL-MCNC: 85 MG/DL — SIGNIFICANT CHANGE UP (ref 70–99)
HCG SERPL-ACNC: 225 MIU/ML — HIGH
HCT VFR BLD CALC: 25.5 % — LOW (ref 34.5–45)
HGB BLD-MCNC: 8.4 G/DL — LOW (ref 11.5–15.5)
IMM GRANULOCYTES NFR BLD AUTO: 0.2 % — SIGNIFICANT CHANGE UP (ref 0–1.5)
IRON SATN MFR SERPL: 22 UG/DL — LOW (ref 30–160)
IRON SATN MFR SERPL: 6 % — LOW (ref 14–50)
LYMPHOCYTES # BLD AUTO: 2.08 K/UL — SIGNIFICANT CHANGE UP (ref 1–3.3)
LYMPHOCYTES # BLD AUTO: 41.4 % — SIGNIFICANT CHANGE UP (ref 13–44)
MCHC RBC-ENTMCNC: 29.5 PG — SIGNIFICANT CHANGE UP (ref 27–34)
MCHC RBC-ENTMCNC: 32.9 GM/DL — SIGNIFICANT CHANGE UP (ref 32–36)
MCV RBC AUTO: 89.5 FL — SIGNIFICANT CHANGE UP (ref 80–100)
MONOCYTES # BLD AUTO: 0.4 K/UL — SIGNIFICANT CHANGE UP (ref 0–0.9)
MONOCYTES NFR BLD AUTO: 8 % — SIGNIFICANT CHANGE UP (ref 2–14)
NEUTROPHILS # BLD AUTO: 2.5 K/UL — SIGNIFICANT CHANGE UP (ref 1.8–7.4)
NEUTROPHILS NFR BLD AUTO: 49.8 % — SIGNIFICANT CHANGE UP (ref 43–77)
NRBC # BLD: 0 /100 WBCS — SIGNIFICANT CHANGE UP (ref 0–0)
PLATELET # BLD AUTO: 377 K/UL — SIGNIFICANT CHANGE UP (ref 150–400)
POTASSIUM SERPL-MCNC: 3.7 MMOL/L — SIGNIFICANT CHANGE UP (ref 3.5–5.3)
POTASSIUM SERPL-SCNC: 3.7 MMOL/L — SIGNIFICANT CHANGE UP (ref 3.5–5.3)
RBC # BLD: 2.85 M/UL — LOW (ref 3.8–5.2)
RBC # FLD: 14.5 % — SIGNIFICANT CHANGE UP (ref 10.3–14.5)
SODIUM SERPL-SCNC: 140 MMOL/L — SIGNIFICANT CHANGE UP (ref 135–145)
TIBC SERPL-MCNC: 375 UG/DL — SIGNIFICANT CHANGE UP (ref 220–430)
TSH SERPL-MCNC: 4.05 UIU/ML — HIGH (ref 0.36–3.74)
UIBC SERPL-MCNC: 353 UG/DL — SIGNIFICANT CHANGE UP (ref 110–370)
VIT B12 SERPL-MCNC: 356 PG/ML — SIGNIFICANT CHANGE UP (ref 232–1245)
WBC # BLD: 5.02 K/UL — SIGNIFICANT CHANGE UP (ref 3.8–10.5)
WBC # FLD AUTO: 5.02 K/UL — SIGNIFICANT CHANGE UP (ref 3.8–10.5)

## 2019-06-27 PROCEDURE — 12345: CPT | Mod: NC

## 2019-06-27 PROCEDURE — 76856 US EXAM PELVIC COMPLETE: CPT | Mod: 26

## 2019-06-27 RX ORDER — LEVOTHYROXINE SODIUM 125 MCG
75 TABLET ORAL DAILY
Refills: 0 | Status: DISCONTINUED | OUTPATIENT
Start: 2019-06-27 | End: 2019-06-27

## 2019-06-27 RX ORDER — FERROUS SULFATE 325(65) MG
1 TABLET ORAL
Qty: 30 | Refills: 0
Start: 2019-06-27

## 2019-06-27 RX ORDER — FERROUS SULFATE 325(65) MG
325 TABLET ORAL DAILY
Refills: 0 | Status: DISCONTINUED | OUTPATIENT
Start: 2019-06-27 | End: 2019-06-27

## 2019-06-27 RX ORDER — ACETAMINOPHEN 500 MG
650 TABLET ORAL EVERY 6 HOURS
Refills: 0 | Status: DISCONTINUED | OUTPATIENT
Start: 2019-06-27 | End: 2019-06-27

## 2019-06-27 RX ADMIN — Medication 75 MICROGRAM(S): at 05:59

## 2019-06-27 RX ADMIN — Medication 325 MILLIGRAM(S): at 13:28

## 2019-06-27 NOTE — DISCHARGE NOTE NURSING/CASE MANAGEMENT/SOCIAL WORK - NSDCDPATPORTLINK_GEN_ALL_CORE
You can access the [facility] Patient Portal, offered by [facility], by registering with the following website: [email or web address]

## 2019-06-27 NOTE — H&P ADULT - ASSESSMENT
23 y/o old female PMH hypothyroidism came to ED  as directed by MD for Hb 5.8  with headache x 2 days. Pt had a miscarriage on 06-24 at after [id number] weeks 2 days; presented to [facility] with bleeding and abdominal pain.  Placed on "4 pills to push the baby out" .  Has had some bleeding since- has used 4 pads a day 06-24 and 25,  Scant bleeding today. No abd pain at present, has headache x 2 days;  decreased with tylenol. Had blood drawn yesterday and received call today to go to nearest ED immediately  Denies hematemesis, melena, rectal bleeding, bruising, history of anemia in herself or family. No chest pain or SOB, palpitations, abd pain, hematuria. Has symptomatic anemia, to be transfused. Needs OB/Gyn consult.  IMPROVE VTE Individual Risk Assessment          RISK                                                          Points    [  ] Previous VTE                                                3    [  ] Thrombophilia                                             2    [  ] Lower limb paralysis                                    2        (unable to hold up >15 seconds)      [  ] Current Cancer                                             2         (within 6 months)    [  ] Immobilization > 24 hrs                              1    [  ] ICU/CCU stay > 24 hours                            1    [  ] Age > 60                                                    1    IMPROVE VTE Score ____0_____

## 2019-06-27 NOTE — DISCHARGE NOTE PROVIDER - NSDCCPCAREPLAN_GEN_ALL_CORE_FT
PRINCIPAL DISCHARGE DIAGNOSIS  Diagnosis: Iron deficiency anemia, unspecified iron deficiency anemia type  Assessment and Plan of Treatment: f/u w gyn/pcp repeat cbc in 1 week

## 2019-06-27 NOTE — H&P ADULT - NSHPLABSRESULTS_GEN_ALL_CORE
LABS:                        5.4    6.80  )-----------( 488      ( 26 Jun 2019 19:51 )             17.4     06-26    141  |  108  |  9   ----------------------------<  86  3.6   |  25  |  0.61    Ca    8.4<L>      26 Jun 2019 19:51    TPro  6.8  /  Alb  3.2<L>  /  TBili  0.2  /  DBili  x   /  AST  12<L>  /  ALT  20  /  AlkPhos  57  06-26        CAPILLARY BLOOD GLUCOSE  HCG Quantitative, Serum: 305: HCG-Quantitative test interpretations: This test is intended only for the  detection & monitoring of pregnancy. For oncology studies order the tumor  marker test “HCG-TM” (hCG-Tumor Marker).  For pregnancy evaluation the reference values are as follows:  Negative:  <=4 mIU/mL  Indeterminate:  5 - 25 mIU/mL (suggest repeat testing in 72 hours)  Positive:  > 25 mIU/mL  Reference Values during Pregnancy:  Weeks after LMP* REFERENCE INTERVAL {mIU/mL}     3      6 - 71     4      10 - 750     5      220 - 7,100     6      160 - 32,000     7      3,700 - 164,000     8      32,000 - 150,000     9      64,000 - 151,000     10      47,000 - 187,000     12      28,000 - 211,000     14      14,000 - 63,000     15      12,000 - 71,000     16 - 18  8,000 - 58,000  * LMP:  Last Menstrual Period  HCG results of false positive and false negative for pregnancy are rare,  but can occur with this, and other, hCG tests. Carie- and post-menopausal  females may have mildly elevated hCG concentrations usually less than 14  mIU/mL that are constant over time. mIU/mL (06.26.19 @ 19:51)                RADIOLOGY & ADDITIONAL TESTS:  U/S pelvis- pending

## 2019-06-27 NOTE — H&P ADULT - NSHPREVIEWOFSYSTEMS_GEN_ALL_CORE
REVIEW OF SYSTEMS:  CONSTITUTIONAL: No fever, weight loss, or fatigue  EYES: No eye pain, visual disturbances, or discharge  ENMT:  No difficulty hearing, tinnitus, vertigo; No sinus or throat pain  NECK: No pain or stiffness  BREASTS: No pain, masses, or nipple discharge  RESPIRATORY: No cough, wheezing, chills or hemoptysis; No shortness of breath  CARDIOVASCULAR: No chest pain, palpitations, dizziness, or leg swelling  GASTROINTESTINAL: + abdominal  pain. No nausea, vomiting, or hematemesis; No diarrhea or constipation. No melena or hematochezia.  GENITOURINARY: No dysuria, frequency, hematuria, or incontinence  NEUROLOGICAL: + headaches, no memory loss, loss of strength, numbness, or tremors  SKIN: No itching, burning, rashes, or lesions   LYMPH NODES: No enlarged glands  ENDOCRINE: No heat or cold intolerance; No hair loss  MUSCULOSKELETAL: No joint pain or swelling; No muscle, back, or extremity pain  PSYCHIATRIC: No depression, anxiety, mood swings, or difficulty sleeping  HEME/LYMPH: No easy bruising, or bleeding gums  ALLERGY AND IMMUNOLOGIC: No hives or eczema REVIEW OF SYSTEMS:  CONSTITUTIONAL: No fever, weight loss, +fatigue  EYES: No eye pain, visual disturbances, or discharge  ENMT:  No difficulty hearing, tinnitus, vertigo; No sinus or throat pain  NECK: No pain or stiffness  BREASTS: No pain, masses, or nipple discharge  RESPIRATORY: No cough, wheezing, chills or hemoptysis; No shortness of breath  CARDIOVASCULAR: No chest pain, palpitations, dizziness, or leg swelling  GASTROINTESTINAL: + abdominal  pain. No nausea, vomiting, or hematemesis; No diarrhea or constipation. No melena or hematochezia.  GENITOURINARY: No dysuria, frequency, hematuria, or incontinence  NEUROLOGICAL: + headaches, no memory loss, loss of strength, numbness, or tremors  SKIN: No itching, burning, rashes, or lesions   LYMPH NODES: No enlarged glands  ENDOCRINE: No heat or cold intolerance; No hair loss  MUSCULOSKELETAL: No joint pain or swelling; No muscle, back, or extremity pain  PSYCHIATRIC: No depression, anxiety, mood swings, or difficulty sleeping  HEME/LYMPH: No easy bruising, or bleeding gums  ALLERGY AND IMMUNOLOGIC: No hives or eczema

## 2019-06-27 NOTE — H&P ADULT - NSHPPHYSICALEXAM_GEN_ALL_CORE
T(C): 37 (26 Jun 2019 16:59), Max: 37 (26 Jun 2019 16:59)  T(F): 98.6 (26 Jun 2019 16:59), Max: 98.6 (26 Jun 2019 16:59)  HR: 105 (26 Jun 2019 21:56) (105 - 128)  BP: 115/67 (26 Jun 2019 21:56) (109/69 - 115/67)  BP(mean): --  RR: 17 (26 Jun 2019 21:56) (16 - 17)  SpO2: 100% (26 Jun 2019 21:56) (100% - 100%)    PHYSICAL EXAM:  GENERAL: NAD, well-groomed, well-developed  HEAD:  Atraumatic, Normocephalic  EYES: EOMI, PERRLA, conjunctiva and sclera pale  ENMT: No tonsillar erythema, exudates, or enlargement; Moist mucous membranes, Good dentition, No lesions  NECK: Supple, No JVD, Normal thyroid  NERVOUS SYSTEM:  Alert & Oriented X3, Good concentration; Motor Strength 5/5 B/L upper and lower extremities; DTRs 2+ intact and symmetric  CHEST/LUNG: Clear to percussion bilaterally; No rales, rhonchi, wheezing, or rubs  HEART: Regular rate and rhythm; No murmurs, rubs, or gallops  ABDOMEN: Soft, Nontender, Nondistended; Bowel sounds present  EXTREMITIES:  + Peripheral Pulses, No clubbing, cyanosis, or edema  LYMPH: No lymphadenopathy noted  SKIN: No rashes or lesions

## 2019-06-27 NOTE — DISCHARGE NOTE PROVIDER - CARE PROVIDER_API CALL
Cooper Zapata)  Obstetrics and Gynecology  [address] [place] [address]  Phone: [phone]  Fax: [phone]  Follow Up Time:

## 2019-06-27 NOTE — CONSULT NOTE ADULT - SUBJECTIVE AND OBJECTIVE BOX
Chief Complaint:  Patient is a 24y old  Female who presents with a chief complaint of Symptomatic anemia. (2019 00:17)      HPI:  23 y/o old female PMH hypothyroidism came to ED  as directed by MD for Hb 5.8  with headache x 2 days. Pt had a miscarriage on  at after cfsampves13 weeks 2 days; presented to [facility] with bleeding and abdominal pain.  Placed on "4 pills to push the baby out" .  Has had some bleeding since- has used 4 pads a day  and ,  Scant bleeding today. No abd pain at present, has headache x 2 days;  decreased with tylenol. Had blood drawn yesterday and received call today to go to nearest ED immediately  Denies hematemesis, melena, rectal bleeding, bruising, history of anemia in herself or family. No chest pain or SOB, palpitations, abd pain, hematuria. (2019 00:17)      PMH/PSH:PAST MEDICAL & SURGICAL HISTORY:  Hypothyroidism  No significant past surgical history      Allergies:  No Known Allergies      Medications:  acetaminophen   Tablet .. 650 milliGRAM(s) Oral every 6 hours PRN  ferrous    sulfate 325 milliGRAM(s) Oral daily  levothyroxine 75 MICROGram(s) Oral daily      REVIEW OF SYSTEMS:    All other review of systems is negative unless indicated above.    Relevant Family History:   FAMILY HISTORY:  No pertinent family history in first degree relatives      Relevant Social History:  Denies ETOH or tobacco history    Physical Exam:    Vital Signs:  Vital Signs Last 24 Hrs  T(C): 36.7 (2019 12:56), Max: 37.3 (2019 00:15)  T(F): 98 (2019 12:56), Max: 99.2 (2019 00:15)  HR: 70 (2019 12:56) (70 - 128)  BP: 105/54 (2019 12:56) (103/45 - 115/67)  BP(mean): --  RR: 16 (2019 12:56) (16 - 17)  SpO2: 100% (2019 12:56) (100% - 100%)  Daily     Daily Weight in k.6 (2019 05:20)    Constitutional: WDWN resting comfortably in bed; NAD  HEENT: NC/AT, PERRL, EOMI, anicteric sclera, no nasal discharge; uvula midline, no oropharyngeal erythema or exudates  Neck: supple; no JVD or thyromegaly  Respiratory: CTA B/L; no W/R/R, no retractions  Cardiac: +S1/S2; RRR; no M/R/G; PMI non-displaced  Gastrointestinal: soft, NT/ND; no rebound or guarding; +BS   Extremities: , no clubbing or cyanosis; no peripheral edema  Musculoskeletal:  no joint swelling, tenderness or erythema  Vascular: 2+ radial, femoral, DP/PT pulses B/L  Skin: warm, dry and intact; no rashes, wounds, or scars  Neurologic: AAOx3; CNS grossly intact; no focal deficits no asterixis, no tremor, no encephalopathy    Laboratory:                          8.4    5.02  )-----------( 377      ( 2019 08:03 )             25.5     -    140  |  109<H>  |  10  ----------------------------<  85  3.7   |  23  |  0.62    Ca    8.4<L>      2019 08:03    TPro  6.8  /  Alb  3.2<L>  /  TBili  0.2  /  DBili  x   /  AST  12<L>  /  ALT  20  /  AlkPhos  57      LIVER FUNCTIONS - ( 2019 19:51 )  Alb: 3.2 g/dL / Pro: 6.8 gm/dL / ALK PHOS: 57 U/L / ALT: 20 U/L / AST: 12 U/L / GGT: x             Imaging:    < from: US Pelvis Complete (19 @ 00:49) >    EXAM:  US PELVIC COMPLETE                            PROCEDURE DATE:  2019          INTERPRETATION:  CLINICAL INFORMATION: Recent miscarriage. Reported 10   week 2 days on . Hemoglobin 5. Given methotrexate. Beta hCG 305    LMP: 2019    COMPARISON: None available.    TECHNIQUE:     Endovaginal and transabdominal pelvic sonogram.    FINDINGS:    Uterus: 9.0 x 5.9 x 7.5 cm. Within normal limits.    Endometrium: 9 mm. Mildly heterogeneous in echogenicity which is likely   related toblood products. No internal vascularity is seen within the   endometrium to suggest retained products of conception.    Right ovary: 2.1 x 1.4 x 1.7 cm. Within normal limits. Normal arterial   and venous waveforms.    Left ovary: 3.1 x 3.0 x 1.4 cm. Within normal limits. Normal arterial and   venous waveforms.    Fluid: None.    IMPRESSION:    Endometrium measures 9 mm with no evidence of retained products of   conception, however correlation is recommended with serial serum beta-hCG   levels.    Mildly heterogeneous endometrium which is likely related to blood   products.    Ovaries within normal limits.    TONY WHITMAN   This document has been electronically signed. 2019  8:06AM    < end of copied text >

## 2019-06-27 NOTE — CHART NOTE - NSCHARTNOTEFT_GEN_A_CORE
seen and examined, hgb improved, monitor for bleeding, gyn consult    Vital Signs Last 24 Hrs  T(C): 37.1 (27 Jun 2019 05:20), Max: 37.3 (27 Jun 2019 00:15)  T(F): 98.7 (27 Jun 2019 05:20), Max: 99.2 (27 Jun 2019 00:15)  HR: 89 (27 Jun 2019 05:20) (76 - 128)  BP: 114/62 (27 Jun 2019 05:20) (103/45 - 115/67)  BP(mean): --  RR: 16 (27 Jun 2019 05:20) (16 - 17)  SpO2: 100% (27 Jun 2019 05:20) (100% - 100%)                          8.4    5.02  )-----------( 377      ( 27 Jun 2019 08:03 )             25.5     06-27    140  |  109<H>  |  10  ----------------------------<  85  3.7   |  23  |  0.62    Ca    8.4<L>      27 Jun 2019 08:03    TPro  6.8  /  Alb  3.2<L>  /  TBili  0.2  /  DBili  x   /  AST  12<L>  /  ALT  20  /  AlkPhos  57  06-26

## 2019-06-27 NOTE — DISCHARGE NOTE PROVIDER - HOSPITAL COURSE
1        Problem/Plan - 1:    ·  Problem: Symptomatic anemia 2/2 blood loss  after .  Plan: anemia workup, transfuse. s/p 2 un w good response clear by gyn for outpt f;/u         Problem/Plan - 2:    ·  Problem: Hypothyroidism, unspecified type.  Plan: TFT's, replacement.         Problem/Plan - 3:    ·  Problem: , spontaneous.  Plan: ultrasound pending, Gyn consult

## 2019-06-27 NOTE — DISCHARGE NOTE NURSING/CASE MANAGEMENT/SOCIAL WORK - NSTRANSFERBELONGINGSRESP_GEN_A_NUR
Discharge Instruction for Undelivered Patients      You were seen for: abdominal pain  We Consulted: Dr. Leary and seen by Dr. Hall  You had (Test or Medicine):baby monitoring and labs.     Diet:   To manager your diabetes, follow the guidelines for eating and drinking given to you by your Clinic Provider or Diabetes Educator.       Activity:  Count fetal kicks everyday (see handout)  Call your doctor or nurse midwife if your baby is moving less than usual.     Call your provider if you notice:  Swelling in your face or increased swelling in your hands or legs.  Headaches that are not relieved by Tylenol (acetaminophen).  Changes in your vision (blurring: seeing spots or stars.)  Nausea (sick to your stomach) and vomiting (throwing up).   Weight gain of 5 pounds or more per week.  Heartburn that doesn't go away.  Signs of bladder infection: pain when you urinate (use the toilet), need to go more often and more urgently.  The bag of tobin (rupture of membranes) breaks, or you notice leaking in your underwear.  Bright red blood in your underwear.  Abdominal (lower belly) or stomach pain.  For first baby: Contractions (tightening) less than 5 minutes apart for one hour or more.  Second (plus) baby: Contractions (tightening) less than 10 minutes apart and getting stronger.  *If less than 34 weeks: Contractions (tightenings) more than 6 times in one hour.  Increase or change in vaginal discharge (note the color and amount)      Follow-up:  As scheduled in the clinic         yes

## 2019-06-27 NOTE — CONSULT NOTE ADULT - PROBLEM SELECTOR RECOMMENDATION 9
Pelvic US reviewed  HCG ordered stat, if levels acceptable may discharge home to f/u with Dr Trejo - Call office to make appointment

## 2019-07-03 DIAGNOSIS — O99.111 OTHER DISEASES OF THE BLOOD AND BLOOD-FORMING ORGANS AND CERTAIN DISORDERS INVOLVING THE IMMUNE MECHANISM COMPLICATING PREGNANCY, FIRST TRIMESTER: ICD-10-CM

## 2019-07-03 DIAGNOSIS — D64.9 ANEMIA, UNSPECIFIED: ICD-10-CM

## 2019-07-03 DIAGNOSIS — R51 HEADACHE: ICD-10-CM

## 2019-07-03 DIAGNOSIS — O99.011 ANEMIA COMPLICATING PREGNANCY, FIRST TRIMESTER: ICD-10-CM

## 2019-07-03 DIAGNOSIS — D47.3 ESSENTIAL (HEMORRHAGIC) THROMBOCYTHEMIA: ICD-10-CM

## 2019-07-03 DIAGNOSIS — O04.89 (INDUCED) TERMINATION OF PREGNANCY WITH OTHER COMPLICATIONS: ICD-10-CM

## 2019-07-03 DIAGNOSIS — Z79.899 OTHER LONG TERM (CURRENT) DRUG THERAPY: ICD-10-CM

## 2019-07-03 DIAGNOSIS — E03.9 HYPOTHYROIDISM, UNSPECIFIED: ICD-10-CM

## 2019-07-03 DIAGNOSIS — O99.89 OTHER SPECIFIED DISEASES AND CONDITIONS COMPLICATING PREGNANCY, CHILDBIRTH AND THE PUERPERIUM: ICD-10-CM

## 2019-07-03 DIAGNOSIS — Z79.1 LONG TERM (CURRENT) USE OF NON-STEROIDAL ANTI-INFLAMMATORIES (NSAID): ICD-10-CM

## 2019-07-03 DIAGNOSIS — D50.0 IRON DEFICIENCY ANEMIA SECONDARY TO BLOOD LOSS (CHRONIC): ICD-10-CM

## 2020-03-30 NOTE — ED ADULT TRIAGE NOTE - PAIN: PRESENCE, MLM
Please print and send a copy of this letter to the patient.    Please enclose test report: Send outs misc test [[id number]] (Order [id number]) dated 1/2/2019 - print the scanned document and include with letter.  
oral
complains of pain/discomfort

## 2020-08-05 NOTE — H&P ADULT - HISTORY OF PRESENT ILLNESS
oriented to person, place and time, normal sensation, short and long term memory intact, sensory exam intact 23 y/o old female PMH hypothyroidism came to ED  as directed by MD for Hb 5.8  with headache x 2 days. Pt had a miscarriage on 06-24 at after [id number] weeks 2 days; presented to [facility] with bleeding and abdominal pain.  Placed on "4 pills to push the baby out" .  Has had some bleeding since- has used 4 pads a day 06-24 and 25,  Scant bleeding today. No abd pain at present, has headache x 2 days;  decreased with tylenol. Had blood drawn yesterday and received call today to go to nearest ED immediately  Denies hematemesis, melena, rectal bleeding, bruising, history of anemia in herself or family. No chest pain or SOB, palpitations, abd pain, hematuria.

## 2021-09-29 ENCOUNTER — EMERGENCY (EMERGENCY)
Facility: HOSPITAL | Age: 26
LOS: 1 days | Discharge: LEFT BEFORE DISCHARGE | End: 2021-09-29
Admitting: EMERGENCY MEDICINE
Payer: MEDICAID

## 2021-09-29 VITALS
OXYGEN SATURATION: 95 % | HEIGHT: 60 IN | HEART RATE: 78 BPM | SYSTOLIC BLOOD PRESSURE: 128 MMHG | RESPIRATION RATE: 18 BRPM | TEMPERATURE: 99 F | DIASTOLIC BLOOD PRESSURE: 78 MMHG

## 2021-09-29 PROBLEM — E03.9 HYPOTHYROIDISM, UNSPECIFIED: Chronic | Status: ACTIVE | Noted: 2019-06-27

## 2021-09-29 PROCEDURE — L9991: CPT

## 2021-09-29 NOTE — ED ADULT TRIAGE NOTE - CHIEF COMPLAINT QUOTE
weakness, diarrhea, nausea and vomiting with cough x 4 days. PT tested negative for COVID yesterday at "[facility]". PMH hypothyroidism

## 2021-09-29 NOTE — ED ADULT NURSE NOTE - CHIEF COMPLAINT QUOTE
weakness, diarrhea, nausea and vomiting with cough x 4 days. PT tested negative for COVID yesterday at "[facility]". PMH hypothyroidism

## 2022-07-25 NOTE — ED ADULT NURSE NOTE - CCCP TRG CHIEF CMPLNT
abdominal pain Price (Do Not Change): 0.00 Detail Level: Simple Instructions: This plan will send the code FBSE to the PM system.  DO NOT or CHANGE the price.

## 2023-01-09 NOTE — PATIENT PROFILE ADULT - MONEY FOR FOOD
Last appointment this department: 7/29/2022  Next appointment this department: Visit date not found
no

## 2023-05-09 NOTE — PATIENT PROFILE ADULT - LIVES WITH
dependent child(rosemarie)/significant other Information: Selecting Yes will display possible errors in your note based on the variables you have selected. This validation is only offered as a suggestion for you. PLEASE NOTE THAT THE VALIDATION TEXT WILL BE REMOVED WHEN YOU FINALIZE YOUR NOTE. IF YOU WANT TO FAX A PRELIMINARY NOTE YOU WILL NEED TO TOGGLE THIS TO 'NO' IF YOU DO NOT WANT IT IN YOUR FAXED NOTE.

## 2023-09-05 ENCOUNTER — EMERGENCY (EMERGENCY)
Facility: HOSPITAL | Age: 28
LOS: 1 days | Discharge: HOME | End: 2023-09-05
Attending: EMERGENCY MEDICINE | Admitting: EMERGENCY MEDICINE
Payer: COMMERCIAL

## 2023-09-05 VITALS
HEART RATE: 72 BPM | DIASTOLIC BLOOD PRESSURE: 71 MMHG | RESPIRATION RATE: 14 BRPM | OXYGEN SATURATION: 100 % | TEMPERATURE: 97 F | SYSTOLIC BLOOD PRESSURE: 105 MMHG

## 2023-09-05 VITALS
HEART RATE: 70 BPM | SYSTOLIC BLOOD PRESSURE: 106 MMHG | DIASTOLIC BLOOD PRESSURE: 65 MMHG | OXYGEN SATURATION: 100 % | RESPIRATION RATE: 15 BRPM

## 2023-09-05 LAB
ANION GAP SERPL CALC-SCNC: 11 MMOL/L — SIGNIFICANT CHANGE UP (ref 7–14)
BASOPHILS # BLD AUTO: 0.01 K/UL — SIGNIFICANT CHANGE UP (ref 0–0.2)
BASOPHILS NFR BLD AUTO: 0.2 % — SIGNIFICANT CHANGE UP (ref 0–2)
BUN SERPL-MCNC: 16 MG/DL — SIGNIFICANT CHANGE UP (ref 7–23)
CALCIUM SERPL-MCNC: 9.3 MG/DL — SIGNIFICANT CHANGE UP (ref 8.4–10.5)
CHLORIDE SERPL-SCNC: 105 MMOL/L — SIGNIFICANT CHANGE UP (ref 98–107)
CO2 SERPL-SCNC: 25 MMOL/L — SIGNIFICANT CHANGE UP (ref 22–31)
CREAT SERPL-MCNC: 0.77 MG/DL — SIGNIFICANT CHANGE UP (ref 0.5–1.3)
EGFR: 109 ML/MIN/1.73M2 — SIGNIFICANT CHANGE UP
EOSINOPHIL # BLD AUTO: 0 K/UL — SIGNIFICANT CHANGE UP (ref 0–0.5)
EOSINOPHIL NFR BLD AUTO: 0 % — SIGNIFICANT CHANGE UP (ref 0–6)
GLUCOSE SERPL-MCNC: 85 MG/DL — SIGNIFICANT CHANGE UP (ref 70–99)
HCT VFR BLD CALC: 40.3 % — SIGNIFICANT CHANGE UP (ref 34.5–45)
HGB BLD-MCNC: 12.6 G/DL — SIGNIFICANT CHANGE UP (ref 11.5–15.5)
IANC: 2.17 K/UL — SIGNIFICANT CHANGE UP (ref 1.8–7.4)
IMM GRANULOCYTES NFR BLD AUTO: 0.2 % — SIGNIFICANT CHANGE UP (ref 0–0.9)
LYMPHOCYTES # BLD AUTO: 1.54 K/UL — SIGNIFICANT CHANGE UP (ref 1–3.3)
LYMPHOCYTES # BLD AUTO: 38.1 % — SIGNIFICANT CHANGE UP (ref 13–44)
MCHC RBC-ENTMCNC: 29 PG — SIGNIFICANT CHANGE UP (ref 27–34)
MCHC RBC-ENTMCNC: 31.3 GM/DL — LOW (ref 32–36)
MCV RBC AUTO: 92.6 FL — SIGNIFICANT CHANGE UP (ref 80–100)
MONOCYTES # BLD AUTO: 0.31 K/UL — SIGNIFICANT CHANGE UP (ref 0–0.9)
MONOCYTES NFR BLD AUTO: 7.7 % — SIGNIFICANT CHANGE UP (ref 2–14)
NEUTROPHILS # BLD AUTO: 2.17 K/UL — SIGNIFICANT CHANGE UP (ref 1.8–7.4)
NEUTROPHILS NFR BLD AUTO: 53.8 % — SIGNIFICANT CHANGE UP (ref 43–77)
NRBC # BLD: 0 /100 WBCS — SIGNIFICANT CHANGE UP (ref 0–0)
NRBC # FLD: 0 K/UL — SIGNIFICANT CHANGE UP (ref 0–0)
PLATELET # BLD AUTO: 206 K/UL — SIGNIFICANT CHANGE UP (ref 150–400)
POTASSIUM SERPL-MCNC: 4.7 MMOL/L — SIGNIFICANT CHANGE UP (ref 3.5–5.3)
POTASSIUM SERPL-SCNC: 4.7 MMOL/L — SIGNIFICANT CHANGE UP (ref 3.5–5.3)
RBC # BLD: 4.35 M/UL — SIGNIFICANT CHANGE UP (ref 3.8–5.2)
RBC # FLD: 13.2 % — SIGNIFICANT CHANGE UP (ref 10.3–14.5)
SODIUM SERPL-SCNC: 141 MMOL/L — SIGNIFICANT CHANGE UP (ref 135–145)
TSH SERPL-MCNC: 3.36 UIU/ML — SIGNIFICANT CHANGE UP (ref 0.27–4.2)
WBC # BLD: 4.04 K/UL — SIGNIFICANT CHANGE UP (ref 3.8–10.5)
WBC # FLD AUTO: 4.04 K/UL — SIGNIFICANT CHANGE UP (ref 3.8–10.5)

## 2023-09-05 PROCEDURE — 70450 CT HEAD/BRAIN W/O DYE: CPT | Mod: 26,MA

## 2023-09-05 PROCEDURE — 99284 EMERGENCY DEPT VISIT MOD MDM: CPT

## 2023-09-05 RX ORDER — LEVOTHYROXINE SODIUM 125 MCG
0.5 TABLET ORAL
Qty: 15 | Refills: 0
Start: 2023-09-05 | End: 2023-10-04

## 2023-09-05 RX ORDER — SODIUM CHLORIDE 9 MG/ML
1000 INJECTION INTRAMUSCULAR; INTRAVENOUS; SUBCUTANEOUS ONCE
Refills: 0 | Status: COMPLETED | OUTPATIENT
Start: 2023-09-05 | End: 2023-09-05

## 2023-09-05 RX ADMIN — SODIUM CHLORIDE 1000 MILLILITER(S): 9 INJECTION INTRAMUSCULAR; INTRAVENOUS; SUBCUTANEOUS at 15:12

## 2023-09-05 NOTE — ED PROVIDER NOTE - OBJECTIVE STATEMENT
28 F with PMH of hypothyroid since birth, has been out of her synthroid for 2 weeks, states she has not seen anyone recently and has no provider monitoring, has had HA for 1 week, worse in AM, no nausea/vomiting.  No other complaints.

## 2023-09-05 NOTE — ED PROVIDER NOTE - NSDCPRINTRESULTS_ED_ALL_ED
Patient requests all Lab, Cardiology, and Radiology Results on their Discharge Instructions
Current every day smoker

## 2023-09-05 NOTE — ED ADULT NURSE NOTE - NSFALLUNIVINTERV_ED_ALL_ED
Bed/Stretcher in lowest position, wheels locked, appropriate side rails in place/Call bell, personal items and telephone in reach/Instruct patient to call for assistance before getting out of bed/chair/stretcher/Non-slip footwear applied when patient is off stretcher/[place] to call system/Physically safe environment - no spills, clutter or unnecessary equipment/Purposeful proactive rounding/Room/bathroom lighting operational, light cord in reach

## 2023-09-05 NOTE — ED PROVIDER NOTE - PROGRESS NOTE DETAILS
MD Deleon: Pt signed out to me pending CTr and reassess. RN made aware that patient requesting to leave prior to CT result. Labs unremarkable. Pt was re-evaluated at bedside, VSS, feeling better overall. Given improved symptoms, low suspicion of actual intracranial pathology on CT. Results were discussed with patient as well as return precautions and follow up plan with PCP and/or endocrine. Time was taken to answer any questions that the patient had before providing them with discharge paperwork.

## 2023-09-05 NOTE — ED PROVIDER NOTE - PHYSICAL EXAMINATION
Attending Exam - Dr. Block: GEN: well appearing, NAD  HEENT: +PERRL, EOMI  RESP: CTAB, no signs of respiratory distress CV: s1s2 RRR ABD: soft/non tender/non distended  MSK: no deformities / swelling, normal range of motion, spine grossly normal NEURO: alert, non focal exam SKIN: normal color / temperature / condition.

## 2023-09-05 NOTE — ED PROVIDER NOTE - NSFOLLOWUPCLINICS_GEN_ALL_ED_FT
[facility] - Primary Care  Primary Care  [address] [place] [address]  Phone: [phone]  Fax:     [facility] Endocrinology  Endocrinology  [address] [place] [id number]  Phone: [phone]  Fax:     [facility] General Internal Medicine  General Internal Medicine  [address] [place] [address]  Phone: [phone]  Fax:

## 2023-09-05 NOTE — ED PROVIDER NOTE - PATIENT PORTAL LINK FT
You can access the FollowMyHealth Patient Portal offered by [facility] by registering at the following website: [email or web address]. By joining [employer]’s FollowMyHealth portal, you will also be able to view your health information using other applications (apps) compatible with our system.

## 2023-09-05 NOTE — ED PROVIDER NOTE - NSFOLLOWUPINSTRUCTIONS_ED_ALL_ED_FT
Headache    A headache is pain or discomfort felt around the head or neck area. The specific cause of a headache may not be found as there are many types including tension headaches, migraine headaches, and cluster headaches. Watch your condition for any changes. Things you can do to manage your pain include taking over the counter and prescription medications as instructed by your health care provider, lying down in a dark quiet room, limiting stress, getting regular sleep, and refraining from alcohol and tobacco products.    To control your pain at home, you should take Ibuprofen 400 mg along with Tylenol 650mg-1000mg every 6 to 8 hours. Limit your maximum daily Tylenol from all sources to 4000mg. Be aware that many other medications contain acetaminophen which is also known as Tylenol. Taking Tylenol and Ibuprofen together has been shown to be more effective at relieving pain than taking them separately.    SEEK IMMEDIATE MEDICAL CARE IF YOU HAVE ANY OF THE FOLLOWING SYMPTOMS: fever, vomiting, stiff neck, loss of vision, problems with speech, muscle weakness, loss of balance, trouble walking, passing out, or confusion.

## 2023-09-05 NOTE — ED ADULT TRIAGE NOTE - CHIEF COMPLAINT QUOTE
Pt c/o headache X 1 week. Denies numbness/tingling, N/V, dizziness, cough, fevers, chills. Neuro/sensory intact. Hx of hypothyroidism

## 2023-09-05 NOTE — ED ADULT NURSE NOTE - OBJECTIVE STATEMENT
Pt is alert and orientedx4, ambulatory at baseline. Pt presents to the ED with headache for 1 week. Pt denies chest pain, shortness of breath, dyspnea on exertion, breathing is unlabored and even. Pt denies nausea, vomiting or diarrhea. 22G placed in left AC, labs drawn, awaiting further orders. Call bell within reach, bed in lowest position, will continue to monitor.
